# Patient Record
Sex: FEMALE | URBAN - METROPOLITAN AREA
[De-identification: names, ages, dates, MRNs, and addresses within clinical notes are randomized per-mention and may not be internally consistent; named-entity substitution may affect disease eponyms.]

---

## 2021-04-08 ENCOUNTER — APPOINTMENT (RX ONLY)
Dept: URBAN - METROPOLITAN AREA CLINIC 57 | Facility: CLINIC | Age: 56
Setting detail: DERMATOLOGY
End: 2021-04-08

## 2021-04-08 DIAGNOSIS — Z41.9 ENCOUNTER FOR PROCEDURE FOR PURPOSES OTHER THAN REMEDYING HEALTH STATE, UNSPECIFIED: ICD-10-CM

## 2021-04-08 PROCEDURE — ? ADDITIONAL NOTES

## 2021-04-08 PROCEDURE — ? MEDICAL CONSULTATION: DYNAMIC RHYTIDES

## 2021-04-08 PROCEDURE — ? BOTOX

## 2021-04-08 ASSESSMENT — LOCATION SIMPLE DESCRIPTION DERM
LOCATION SIMPLE: LEFT FOREHEAD
LOCATION SIMPLE: RIGHT FOREHEAD
LOCATION SIMPLE: RIGHT EYEBROW
LOCATION SIMPLE: LEFT EYEBROW
LOCATION SIMPLE: GLABELLA

## 2021-04-08 ASSESSMENT — LOCATION DETAILED DESCRIPTION DERM
LOCATION DETAILED: RIGHT MEDIAL EYEBROW
LOCATION DETAILED: RIGHT CENTRAL EYEBROW
LOCATION DETAILED: RIGHT LATERAL FOREHEAD
LOCATION DETAILED: LEFT CENTRAL EYEBROW
LOCATION DETAILED: LEFT MEDIAL FOREHEAD
LOCATION DETAILED: GLABELLA
LOCATION DETAILED: RIGHT MEDIAL FOREHEAD
LOCATION DETAILED: RIGHT FOREHEAD
LOCATION DETAILED: LEFT FOREHEAD

## 2021-04-08 ASSESSMENT — LOCATION ZONE DERM: LOCATION ZONE: FACE

## 2021-04-08 NOTE — PROCEDURE: BOTOX
Price (Use Numbers Only, No Special Characters Or $): 976 Price (Use Numbers Only, No Special Characters Or $): 224

## 2021-04-08 NOTE — PROCEDURE: BOTOX
Post-Care Instructions: Patient instructed to not lie down for 4 hours and limit physical activity for 24 hours. Patient instructed not to travel by airplane for 48 hours.\\n1. Try to exercise your treated muscles for 1-2 hours after treatment (e.g. practice frowning, raising your eyebrows or squinting). This helps to work BOTOX Cosmetic into your muscles. Although this is\\nthought to help, it will NOT impact your treatment negatively if you forget to do this.\\n2. Do NOT rub or massage the treated areas for 2 hours after your treatment. Do NOT do strenuous exercise for 4 hours after treatment. Also avoid facials or saunas for 4 hours after your treatment. This will minimize the risk of raising your blood pressure and therefore minimize the risk of temporary bruising. Feel free to shower and go about most other regular daily activities.\\n3. Do NOT lie down for 4 hours after treatment. This is to avoid the risk of pressure on the treated areas (from your pillow) and to avoid the risk of having the area rubbed accidentally.\\n4. Be assured that any tiny bumps or marks will go away within a few hours. If you need to apply make-up within 4 hours after treatment, only use a GENTLE touch to avoid rubbing the treated area.\\n5. Results of your treatment may take up to 14 days to take full effect. Please wait until the 14 days has passed before assessing if you are pleased with the result.\\n6. ERMA Serrano needs to see you for a 2 week follow up assessment appointment. This will ensure ERMA Serrano is able to see how YOUR facial muscles reacted to your treatment. If you require more product to fine tune/adjust your treatment results, it will be applied during this appointment at an additional cost. For medical reasons, your results will be photographed and documented in your\\nconfidential patient file.\\n7. Because BOTOX Cosmetic requires a special technique in order to customize the injections to your individual muscular structure, it is important that your muscle actively recovers BUT that your skin is not creasing to the point from where you started.\\n8. BOTOX Cosmetic is a temporary procedure and at first, you may find that your treatment results will last approximately 3-4\\nmonths. If you maintain your treatment appointments with the frequency recommended by ERMA Serrano, the duration of each\\ntreatment result may last longer than 4 months.\\n9. Initially ERMA Serrano sees her patients between the 3 months (12 week) and 4 months (16 week) time period. She is able to create\\nthe best clinical results for you during this period. If you allow BOTOX Cosmetics to completely wear off, it is difficult for ERMA Serrano to be able to see how your individual muscles reacted and therefore optimal results for YOUR face can be more difficult to achieve.\\n10. ERMA Serrano will need to see you again in 3-4 month. Please ensure you schedule this appointment before you leave our office\\ntoday.

## 2021-04-08 NOTE — PROCEDURE: ADDITIONAL NOTES
Render Risk Assessment In Note?: no
Detail Level: Detailed
Additional Notes: The patient is present due to concerns of dynamic rhytides and would like to pursue Botox. The patient stated that she is on a tight budget and can only afford to receive 20-25 units of Botox. The patient was informed based on her budget and cosmetic concerns of glabella and request for a lifted appearance of the brows she is in need of 25 units of Botox. The patient was quoted $13/unit and informed that Botox has a longevity of 3-4 months. The patient agreed with recommended treatment plan. The patient inquired how much Botox is needed for effective treatment of periorbital region, and was informed that she is in need of 30 units of Botox for effective treatment.\\n\\nThe patient is interested in receiving dermal fillers to address vertical lip lines and volume loss of vermillion lips and would like to further discuss this treatment option at the time of her 2 week follow up. The patient inquired what the starting price would be for dermal lip fillers and was quoted $650-$700 price range for treatment of vermillion lips.

## 2024-04-30 ENCOUNTER — APPOINTMENT (RX ONLY)
Dept: URBAN - METROPOLITAN AREA CLINIC 342 | Facility: CLINIC | Age: 59
Setting detail: DERMATOLOGY
End: 2024-04-30

## 2024-04-30 DIAGNOSIS — Z41.9 ENCOUNTER FOR PROCEDURE FOR PURPOSES OTHER THAN REMEDYING HEALTH STATE, UNSPECIFIED: ICD-10-CM

## 2024-04-30 PROCEDURE — ? ADDITIONAL NOTES

## 2024-04-30 PROCEDURE — ? BOTOX (U OR CC)

## 2024-04-30 PROCEDURE — ? COSMETIC CONSULTATION: BOTOX

## 2024-04-30 ASSESSMENT — LOCATION DETAILED DESCRIPTION DERM
LOCATION DETAILED: RIGHT LATERAL EYEBROW
LOCATION DETAILED: GLABELLA
LOCATION DETAILED: LEFT MEDIAL EYEBROW
LOCATION DETAILED: RIGHT MEDIAL EYEBROW
LOCATION DETAILED: RIGHT CENTRAL EYEBROW

## 2024-04-30 ASSESSMENT — LOCATION ZONE DERM: LOCATION ZONE: FACE

## 2024-04-30 ASSESSMENT — LOCATION SIMPLE DESCRIPTION DERM
LOCATION SIMPLE: GLABELLA
LOCATION SIMPLE: RIGHT EYEBROW
LOCATION SIMPLE: LEFT EYEBROW

## 2024-04-30 NOTE — PROCEDURE: BOTOX (U OR CC)
Dilution (U/0.1 Cc): 1
Additional Area 6 Units: 0
Additional Area 2 Location: right brow
Additional Area 2 Units: 6
Detail Level: Detailed
Consent: Written consent obtained. Risks include but not limited to lid/brow ptosis, bruising, swelling, diplopia, temporary effect, incomplete chemical denervation.
Lot #: [id number]
Additional Area 1 Location: [facility]
Including Pricing Information In The Note: No
Glabellar Complex Units: 18
Document As Units Or Cc?: units
Expiration Date (Month Year): 2025/11
Post-Care Instructions: Patient instructed to not lie down for 4 hours and limit physical activity for 24 hours.
Price (Use Numbers Only, No Special Characters Or $): 336.00
Quantity Per Injection Site (Units Or Cc): 3 U
Quantity Per Injection Site (Units Or Cc): 6 U
Quantity Per Injection Site (Units Or Cc): 2 U

## 2024-04-30 NOTE — PROCEDURE: ADDITIONAL NOTES
Additional Notes: Recommended \\nBotox \\nRight brow - 6\\nGlabella - 18\\nTotal - 24 units = 336.00
Render Risk Assessment In Note?: no
Detail Level: Detailed